# Patient Record
Sex: FEMALE | Race: WHITE | Employment: FULL TIME | URBAN - NONMETROPOLITAN AREA
[De-identification: names, ages, dates, MRNs, and addresses within clinical notes are randomized per-mention and may not be internally consistent; named-entity substitution may affect disease eponyms.]

---

## 2017-01-03 ENCOUNTER — HISTORY (OUTPATIENT)
Dept: EMERGENCY MEDICINE | Facility: OTHER | Age: 52
End: 2017-01-03

## 2017-01-23 ENCOUNTER — HISTORY (OUTPATIENT)
Dept: FAMILY MEDICINE | Facility: OTHER | Age: 52
End: 2017-01-23

## 2018-01-23 ENCOUNTER — DOCUMENTATION ONLY (OUTPATIENT)
Dept: FAMILY MEDICINE | Facility: OTHER | Age: 53
End: 2018-01-23

## 2018-01-23 PROBLEM — N39.498 OTHER URINARY INCONTINENCE: Status: ACTIVE | Noted: 2018-01-23

## 2018-01-23 PROBLEM — F41.0 PANIC DISORDER: Status: ACTIVE | Noted: 2018-01-23

## 2018-01-23 PROBLEM — N87.9 DYSPLASIA OF CERVIX: Status: ACTIVE | Noted: 2018-01-23

## 2018-01-23 RX ORDER — DIPHENOXYLATE HYDROCHLORIDE AND ATROPINE SULFATE 2.5; .025 MG/1; MG/1
1 TABLET ORAL DAILY
COMMUNITY
End: 2019-10-01

## 2018-01-23 RX ORDER — ZOLPIDEM TARTRATE 5 MG/1
TABLET ORAL
COMMUNITY
Start: 2016-05-07 | End: 2019-10-01

## 2018-01-23 RX ORDER — CITALOPRAM HYDROBROMIDE 40 MG/1
40 TABLET ORAL DAILY
COMMUNITY
Start: 2016-03-07 | End: 2018-10-15

## 2018-01-23 RX ORDER — CLONAZEPAM 0.5 MG/1
0.5 TABLET ORAL DAILY
COMMUNITY
Start: 2016-03-21 | End: 2019-10-01

## 2018-01-23 RX ORDER — ALBUTEROL SULFATE 90 UG/1
2 AEROSOL, METERED RESPIRATORY (INHALATION) 4 TIMES DAILY PRN
COMMUNITY
Start: 2017-01-03 | End: 2019-10-01

## 2018-03-01 ENCOUNTER — OFFICE VISIT (OUTPATIENT)
Dept: FAMILY MEDICINE | Facility: OTHER | Age: 53
End: 2018-03-01
Attending: NURSE PRACTITIONER
Payer: COMMERCIAL

## 2018-03-01 VITALS
TEMPERATURE: 98.6 F | HEART RATE: 87 BPM | SYSTOLIC BLOOD PRESSURE: 130 MMHG | BODY MASS INDEX: 26.84 KG/M2 | DIASTOLIC BLOOD PRESSURE: 66 MMHG | WEIGHT: 151.5 LBS | OXYGEN SATURATION: 93 % | HEIGHT: 63 IN

## 2018-03-01 DIAGNOSIS — J01.00 ACUTE NON-RECURRENT MAXILLARY SINUSITIS: Primary | ICD-10-CM

## 2018-03-01 PROCEDURE — 99213 OFFICE O/P EST LOW 20 MIN: CPT | Performed by: NURSE PRACTITIONER

## 2018-03-01 PROCEDURE — G0463 HOSPITAL OUTPT CLINIC VISIT: HCPCS

## 2018-03-01 RX ORDER — CEFDINIR 300 MG/1
300 CAPSULE ORAL 2 TIMES DAILY
Qty: 14 CAPSULE | Refills: 0 | Status: SHIPPED | OUTPATIENT
Start: 2018-03-01 | End: 2018-03-08

## 2018-03-01 ASSESSMENT — PAIN SCALES - GENERAL: PAINLEVEL: MILD PAIN (2)

## 2018-03-01 ASSESSMENT — ENCOUNTER SYMPTOMS: FEVER: 1

## 2018-03-01 NOTE — MR AVS SNAPSHOT
After Visit Summary   3/1/2018    Rowena Henry    MRN: [id number]           Patient Information     Date Of Birth          1965        Visit Information        Provider Department      3/1/2018 2:00 PM Otilia Yee APRN [facility]        Today's Diagnoses     Acute non-recurrent maxillary sinusitis    -  1      Care Instructions      Sinusitis (Antibiotic Treatment)    The sinuses are air-filled spaces within the bones of the face. They connect to the inside of the nose. Sinusitis is an inflammation of the tissue lining the sinus cavity. Sinus inflammation can occur during a cold. It can also be due to allergies to pollens and other particles in the air. Sinusitis can cause symptoms of sinus congestion and fullness. A sinus infection causes fever, headache and facial pain. There is often green or yellow drainage from the nose or into the back of the throat (post-nasal drip). You have been given antibiotics to treat this condition.  Home care:    Take the full course of antibiotics as instructed. Do not stop taking them, even if you feel better.    Drink plenty of water, hot tea, and other liquids. This may help thin mucus. It also may promote sinus drainage.    Heat may help soothe painful areas of the face. Use a towel soaked in hot water. Or,  the shower and direct the hot spray onto your face. Using a vaporizer along with a menthol rub at night may also help.     An expectorant containing guaifenesin may help thin the mucus and promote drainage from the sinuses.    Over-the-counter decongestants may be used unless a similar medicine was prescribed. Nasal sprays work the fastest. Use one that contains phenylephrine or oxymetazoline. First blow the nose gently. Then use the spray. Do not use these medicines more often than directed on the label or symptoms may get worse. You may also use tablets containing pseudoephedrine. Avoid products that  combine ingredients, because side effects may be increased. Read labels. You can also ask the pharmacist for help. (NOTE: Persons with high blood pressure should not use decongestants. They can raise blood pressure.)    Over-the-counter antihistamines may help if allergies contributed to your sinusitis.      Do not use nasal rinses or irrigation during an acute sinus infection, unless told to by your health care provider. Rinsing may spread the infection to other sinuses.    Use acetaminophen or ibuprofen to control pain, unless another pain medicine was prescribed. (If you have chronic liver or kidney disease or ever had a stomach ulcer, talk with your doctor before using these medicines. Aspirin should never be used in anyone under 18 years of age who is ill with a fever. It may cause severe liver damage.)    Don't smoke. This can worsen symptoms.  Follow-up care  Follow up with your healthcare provider or our staff if you are not improving within the next week.  When to seek medical advice  Call your healthcare provider if any of these occur:    Facial pain or headache becoming more severe    Stiff neck    Unusual drowsiness or confusion    Swelling of the forehead or eyelids    Vision problems, including blurred or double vision    Fever of 100.4 F (38 C) or higher, or as directed by your healthcare provider    Seizure    Breathing problems    Symptoms not resolving within 10 days  Date Last Reviewed: 4/13/2015 2000-2017 [employer]. [address] [place] [address], [place] [address]. All rights reserved. This information is not intended as a substitute for professional medical care. Always follow your healthcare professional's instructions.                Follow-ups after your visit        Who to contact     If you have questions or need follow up information about today's clinic visit or your schedule please contact [facility] directly at [phone].  Normal or non-critical lab  "and imaging results will be communicated to you by MyChart, letter or phone within 4 business days after the clinic has received the results. If you do not hear from us within 7 days, please contact the clinic through [employer] or phone. If you have a critical or abnormal lab result, we will notify you by phone as soon as possible.  Submit refill requests through [employer] or call your pharmacy and they will forward the refill request to us. Please allow 3 business days for your refill to be completed.          Additional Information About Your Visit        OpenSynergyharThat{img} Information     [employer] lets you send messages to your doctor, view your test results, renew your prescriptions, schedule appointments and more. To sign up, go to [email or web address] . Click on \"Log in\" on the left side of the screen, which will take you to the Welcome page. Then click on \"Sign up Now\" on the right side of the page.     You will be asked to enter the access code listed below, as well as some personal information. Please follow the directions to create your username and password.     Your access code is: [email or web address]  Expires: 2018  2:55 PM     Your access code will  in 90 days. If you need help or a new code, please call your [place] clinic or [phone].        Care EveryWhere ID     This is your Care EveryWhere ID. This could be used by other organizations to access your [place] medical records  [id number]        Your Vitals Were     Pulse Temperature Height Pulse Oximetry Breastfeeding? BMI (Body Mass Index)    87 98.6  F (37  C) (Tympanic) 5' 3\" (1.6 m) 93% No 26.84 kg/m2       Blood Pressure from Last 3 Encounters:   18 130/66   16 110/60   16 122/72    Weight from Last 3 Encounters:   18 151 lb 8 oz (68.7 kg)   16 154 lb 3.2 oz (69.9 kg)   16 154 lb (69.9 kg)              Today, you had the following     No orders found for display         Today's Medication Changes          These " changes are accurate as of 3/1/18  2:55 PM.  If you have any questions, ask your nurse or doctor.               Start taking these medicines.        Dose/Directions    cefdinir 300 MG capsule   Commonly known as:  OMNICEF   Used for:  Acute non-recurrent maxillary sinusitis   Started by:  Otilia Yee APRN CNP        Dose:  300 mg   Take 1 capsule (300 mg) by mouth 2 times daily for 7 days   Quantity:  14 capsule   Refills:  0            Where to get your medicines      These medications were sent to [id number] - [place], [place] - [address]  [address], [facility] [address]     Phone:  [phone]     cefdinir 300 MG capsule                Primary Care Provider    Non-Provider Gidarinel       No address on file        Equal Access to Services     NAYELI MCCARTHY AH: Sachin de la o Sojagdish, waaxda luqadaha, qaybta kaalmada adeabelyabravo, laila brito . So [facility] [phone].    ATENCIÓN: Si habla español, tiene a julian disposición servicios gratuitos de asistencia lingüística. Llame al [phone].    We comply with applicable federal civil rights laws and [place] laws. We do not discriminate on the basis of race, color, national origin, age, disability, sex, sexual orientation, or gender identity.            Thank you!     Thank you for choosing [facility] \[facility]\""  for your care. Our goal is always to provide you with excellent care. Hearing back from our patients is one way we can continue to improve our services. Please take a few minutes to complete the written survey that you may receive in the mail after your visit with us. Thank you!             Your Updated Medication List - Protect others around you: Learn how to safely use, store and throw away your medicines at www.disposemymeds.org.          This list is accurate as of 3/1/18  2:55 PM.  Always use your most recent med list.                   Brand Name  Dispense Instructions for use Diagnosis    albuterol 108 (90 BASE) MCG/ACT Inhaler    PROAIR HFA/PROVENTIL HFA/VENTOLIN HFA     Inhale 2 puffs into the lungs 4 times daily as needed        CALCIUM 600 + D PO      Take 1 tablet by mouth 2 times daily        CALCIUM 600+D 600-200 MG-UNIT Tabs   Generic drug:  calcium carbonate-vitamin D      Take 1 tablet by mouth 2 times daily.        cefdinir 300 MG capsule    OMNICEF    14 capsule    Take 1 capsule (300 mg) by mouth 2 times daily for 7 days    Acute non-recurrent maxillary sinusitis       * celeXA 40 MG tablet   Generic drug:  citalopram      Take 1 tablet by mouth daily.        * citalopram 40 MG tablet    celeXA     Take 40 mg by mouth daily        * klonoPIN 1 MG tablet   Generic drug:  clonazePAM      Take 1 tablet by mouth. Every bedtime        * clonazePAM 0.5 MG tablet    klonoPIN     Take 0.5 mg by mouth daily        MULTI-VITAMINS Tabs      Take 1 tablet by mouth daily        MULTIVITAMIN PO      Take 1 tablet by mouth daily with food.        zolpidem 5 MG tablet    AMBIEN          * Notice:  This list has 4 medication(s) that are the same as other medications prescribed for you. Read the directions carefully, and ask your doctor or other care provider to review them with you.

## 2018-03-01 NOTE — PATIENT INSTRUCTIONS
Sinusitis (Antibiotic Treatment)    The sinuses are air-filled spaces within the bones of the face. They connect to the inside of the nose. Sinusitis is an inflammation of the tissue lining the sinus cavity. Sinus inflammation can occur during a cold. It can also be due to allergies to pollens and other particles in the air. Sinusitis can cause symptoms of sinus congestion and fullness. A sinus infection causes fever, headache and facial pain. There is often green or yellow drainage from the nose or into the back of the throat (post-nasal drip). You have been given antibiotics to treat this condition.  Home care:    Take the full course of antibiotics as instructed. Do not stop taking them, even if you feel better.    Drink plenty of water, hot tea, and other liquids. This may help thin mucus. It also may promote sinus drainage.    Heat may help soothe painful areas of the face. Use a towel soaked in hot water. Or,  the shower and direct the hot spray onto your face. Using a vaporizer along with a menthol rub at night may also help.     An expectorant containing guaifenesin may help thin the mucus and promote drainage from the sinuses.    Over-the-counter decongestants may be used unless a similar medicine was prescribed. Nasal sprays work the fastest. Use one that contains phenylephrine or oxymetazoline. First blow the nose gently. Then use the spray. Do not use these medicines more often than directed on the label or symptoms may get worse. You may also use tablets containing pseudoephedrine. Avoid products that combine ingredients, because side effects may be increased. Read labels. You can also ask the pharmacist for help. (NOTE: Persons with high blood pressure should not use decongestants. They can raise blood pressure.)    Over-the-counter antihistamines may help if allergies contributed to your sinusitis.      Do not use nasal rinses or irrigation during an acute sinus infection, unless told to by  your health care provider. Rinsing may spread the infection to other sinuses.    Use acetaminophen or ibuprofen to control pain, unless another pain medicine was prescribed. (If you have chronic liver or kidney disease or ever had a stomach ulcer, talk with your doctor before using these medicines. Aspirin should never be used in anyone under 18 years of age who is ill with a fever. It may cause severe liver damage.)    Don't smoke. This can worsen symptoms.  Follow-up care  Follow up with your healthcare provider or our staff if you are not improving within the next week.  When to seek medical advice  Call your healthcare provider if any of these occur:    Facial pain or headache becoming more severe    Stiff neck    Unusual drowsiness or confusion    Swelling of the forehead or eyelids    Vision problems, including blurred or double vision    Fever of 100.4 F (38 C) or higher, or as directed by your healthcare provider    Seizure    Breathing problems    Symptoms not resolving within 10 days  Date Last Reviewed: 4/13/2015 2000-2017 [employer]. [address] [place] [address], [place] [id number]. All rights reserved. This information is not intended as a substitute for professional medical care. Always follow your healthcare professional's instructions.

## 2018-03-01 NOTE — NURSING NOTE
Patient present to clinic today with bilateral ear pain with fevers.   Enedina Michael CMA..............3/1/2018........2:22 PM

## 2018-03-01 NOTE — PROGRESS NOTES
HPI Comments: Nursing Notes:   Enedina Michael CMA  3/1/2018  2:22 PM  Unsigned  Patient present to clinic today with bilateral ear pain with fevers.   Enedina Michael CMA..............3/1/2018........2:22 PM    Has had three bouts of symptoms that include: fevers, pressure in ears and sinuses. Body aches when fevers present with fevers up to 102, fevers are now low grade and started about a week ago this time.  No flu shot.  Almost like symptoms never fully go away, they just get better and then come back again.         Review of Systems   Constitutional: Positive for fever.        Body aches   HENT: Positive for congestion and ear pain.          Physical Exam   Constitutional: She is oriented to person, place, and time and well-developed, well-nourished, and in no distress.   HENT:   Right Ear: External ear normal.   Nose: Nose normal.   Clear effusions bilaterally, maxillary sinus tenderness   Cardiovascular: Normal heart sounds.    Pulmonary/Chest: Breath sounds normal.   Lymphadenopathy:     She has no cervical adenopathy.   Neurological: She is alert and oriented to person, place, and time.   Skin: Skin is warm and dry.   Psychiatric: Affect normal.     Assessment: On exam, well appearing female without fever, lungs clear to auscultation, TMs with clear effusions, tonsils without erythema, maxillary sinus tenderness    Plan: Treat with Omnicef 300 mgs PO BID 7 days  Tylenol/Ibuprofen as needed  Follow up as needed

## 2018-10-15 ENCOUNTER — OFFICE VISIT (OUTPATIENT)
Dept: FAMILY MEDICINE | Facility: OTHER | Age: 53
End: 2018-10-15
Payer: COMMERCIAL

## 2018-10-15 VITALS
HEIGHT: 63 IN | OXYGEN SATURATION: 95 % | TEMPERATURE: 99 F | WEIGHT: 160.1 LBS | HEART RATE: 88 BPM | BODY MASS INDEX: 28.37 KG/M2 | RESPIRATION RATE: 18 BRPM

## 2018-10-15 DIAGNOSIS — R79.89 LOW VITAMIN D LEVEL: ICD-10-CM
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
## 2018-10-15 DIAGNOSIS — R53.83 OTHER FATIGUE: ICD-10-CM

## 2018-10-15 LAB
ALBUMIN SERPL-MCNC: 4.4 G/DL (ref 3.5–5.7)
ALP SERPL-CCNC: 97 U/L (ref 34–104)
ALT SERPL W P-5'-P-CCNC: 44 U/L (ref 7–52)
ANION GAP SERPL CALCULATED.3IONS-SCNC: 7 MMOL/L (ref 3–14)
AST SERPL W P-5'-P-CCNC: 25 U/L (ref 13–39)
BILIRUB SERPL-MCNC: 0.3 MG/DL (ref 0.3–1)
BUN SERPL-MCNC: 16 MG/DL (ref 7–25)
CALCIUM SERPL-MCNC: 9.9 MG/DL (ref 8.6–10.3)
CHLORIDE SERPL-SCNC: 99 MMOL/L (ref 98–107)
CO2 SERPL-SCNC: 28 MMOL/L (ref 21–31)
CREAT SERPL-MCNC: 0.58 MG/DL (ref 0.6–1.2)
DEPRECATED CALCIDIOL+CALCIFEROL SERPL-MC: <10 NG/ML
ERYTHROCYTE [DISTWIDTH] IN BLOOD BY AUTOMATED COUNT: 11.9 % (ref 10–15)
GFR SERPL CREATININE-BSD FRML MDRD: >90 ML/MIN/1.7M2
GLUCOSE SERPL-MCNC: 102 MG/DL (ref 70–105)
HCT VFR BLD AUTO: 41.9 % (ref 35–47)
HGB BLD-MCNC: 14.4 G/DL (ref 11.7–15.7)
MCH RBC QN AUTO: 30.9 PG (ref 26.5–33)
MCHC RBC AUTO-ENTMCNC: 34.4 G/DL (ref 31.5–36.5)
MCV RBC AUTO: 90 FL (ref 78–100)
PHOSPHATE SERPL-MCNC: 4.2 MG/DL (ref 2.5–5)
PLATELET # BLD AUTO: 288 10E9/L (ref 150–450)
POTASSIUM SERPL-SCNC: 3.9 MMOL/L (ref 3.5–5.1)
PROT SERPL-MCNC: 7.1 G/DL (ref 6.4–8.9)
PTH-INTACT SERPL-MCNC: 51 PG/ML (ref 12–88)
RBC # BLD AUTO: 4.66 10E12/L (ref 3.8–5.2)
SODIUM SERPL-SCNC: 134 MMOL/L (ref 134–144)
TSH SERPL DL<=0.05 MIU/L-ACNC: 2.98 IU/ML (ref 0.34–5.6)
WBC # BLD AUTO: 6.5 10E9/L (ref 4–11)

## 2018-10-15 PROCEDURE — 84443 ASSAY THYROID STIM HORMONE: CPT | Performed by: NURSE PRACTITIONER

## 2018-10-15 PROCEDURE — 87798 DETECT AGENT NOS DNA AMP: CPT | Performed by: NURSE PRACTITIONER

## 2018-10-15 PROCEDURE — 85027 COMPLETE CBC AUTOMATED: CPT | Performed by: NURSE PRACTITIONER

## 2018-10-15 PROCEDURE — 82306 VITAMIN D 25 HYDROXY: CPT | Performed by: NURSE PRACTITIONER

## 2018-10-15 PROCEDURE — 83970 ASSAY OF PARATHORMONE: CPT | Performed by: NURSE PRACTITIONER

## 2018-10-15 PROCEDURE — 36415 COLL VENOUS BLD VENIPUNCTURE: CPT | Performed by: NURSE PRACTITIONER

## 2018-10-15 PROCEDURE — G0463 HOSPITAL OUTPT CLINIC VISIT: HCPCS

## 2018-10-15 PROCEDURE — 84100 ASSAY OF PHOSPHORUS: CPT | Performed by: NURSE PRACTITIONER

## 2018-10-15 PROCEDURE — 86618 LYME DISEASE ANTIBODY: CPT | Performed by: NURSE PRACTITIONER

## 2018-10-15 PROCEDURE — 80053 COMPREHEN METABOLIC PANEL: CPT | Performed by: NURSE PRACTITIONER

## 2018-10-15 PROCEDURE — 99213 OFFICE O/P EST LOW 20 MIN: CPT | Performed by: NURSE PRACTITIONER

## 2018-10-15 RX ORDER — ERGOCALCIFEROL 1.25 MG/1
50000 CAPSULE, LIQUID FILLED ORAL
Qty: 8 CAPSULE | Refills: 0 | Status: SHIPPED | OUTPATIENT
Start: 2018-10-15 | End: 2018-12-04

## 2018-10-15 ASSESSMENT — PAIN SCALES - GENERAL: PAINLEVEL: NO PAIN (0)

## 2018-10-15 NOTE — PROGRESS NOTES
HPI:    Rowena Henry is a 53 year old female who presents to clinic today for tick bite. She was bit by a tick about 4 months ago. Feel this is not healing, was bit in left groin. Using peroxide to area every other day.  Is also having fatigue, generally not feeling well. She has had hot flashes. No fevers. No body aches. Decrease in Klonopin, no other medications changes. Fees depression/anxiety is well managed.     Past Medical History:   Diagnosis Date     Breast lump     12/04     Dysplasia of cervix uteri     No Comments Provided       Past Surgical History:   Procedure Laterality Date     COLONOSCOPY      2/18/13,Colonoscopy F/U 2016     OTHER SURGICAL HISTORY      238717,OTHER     OTHER SURGICAL HISTORY      02969.0,AR CONIZATION CERVIX KNIFE/LASER     OTHER SURGICAL HISTORY      1989,04675.0,AR LIGATE FALLOPIAN TUBE     OTHER SURGICAL HISTORY      1/2011,206355,OTHER,[place]       Family History   Problem Relation Age of Onset     HEART DISEASE Mother      Heart Disease     HEART DISEASE Maternal Grandfather      Heart Disease     HEART DISEASE Father      Heart Disease     Other - See Comments Maternal Grandmother 80     leukemia     HEART DISEASE Maternal Uncle      Heart Disease     Other - See Comments Paternal Uncle 60     melanoma     Breast Cancer No family hx of      Cancer-breast     Colon Cancer No family hx of      Cancer-colon     Ovarian Cancer No family hx of      Cancer-ovarian     Prostate Cancer No family hx of      Cancer-prostate     Diabetes No family hx of      Diabetes     Hypertension No family hx of      Hypertension     Thyroid Disease No family hx of      Thyroid Disease       Social History     Social History     Marital status: Unknown     Spouse name: N/A     Number of children: N/A     Years of education: N/A     Occupational History     Not on file.     Social History Main Topics     Smoking status: Current Every Day Smoker     Packs/day: 1.00     Years: 32.00     Types:  Cigarettes     Smokeless tobacco: Never Used     Alcohol use 12.6 oz/week      Comment: Alcoholic Drinks/day: nightly wine  to help her sleep     Drug use: No      Comment: Drug use: No     Sexual activity: Yes     Partners: Male     Birth control/ protection: Surgical     Other Topics Concern     Not on file     Social History Narrative    Single, lives with her boyfriend.    Has 2 children, one son 1985 and he has a child and lives in , 1 daughter 1988, lives in [place].       Current Outpatient Prescriptions   Medication Sig Dispense Refill     albuterol (PROAIR HFA/PROVENTIL HFA/VENTOLIN HFA) 108 (90 BASE) MCG/ACT Inhaler Inhale 2 puffs into the lungs 4 times daily as needed       Calcium Carb-Cholecalciferol (CALCIUM 600 + D PO) Take 1 tablet by mouth 2 times daily       calcium carbonate-vitamin D (CALCIUM 600+D) 600-200 MG-UNIT TABS Take 1 tablet by mouth 2 times daily.       citalopram (CELEXA) 40 MG tablet Take 1 tablet by mouth daily.       clonazePAM (KLONOPIN) 0.5 MG tablet Take 0.5 mg by mouth daily       clonazePAM (KLONOPIN) 1 MG tablet Take 1 tablet by mouth. Every bedtime       Multiple Vitamin (MULTI-VITAMINS) TABS Take 1 tablet by mouth daily       Multiple Vitamins-Minerals (MULTIVITAMIN OR) Take 1 tablet by mouth daily with food.       zolpidem (AMBIEN) 5 MG tablet        [DISCONTINUED] citalopram (CELEXA) 40 MG tablet Take 40 mg by mouth daily         No Known Allergies    ROS:  Pertinent positives and negatives are noted in HPI.    EXAM:  General appearance: well appearing female, in no acute distress  Respiratory: clear to auscultation bilaterally  Cardiac: RRR with no murmurs  Dermatological: left groin with pea sized purplish discoloration noted  Psychological: normal affect, alert and pleasant    ASSESSMENT AND PLAN:    1. Tick bite, initial encounter    2. Other fatigue      Tick bite location is healing well. Discussed no longer using hydrogen peroxide. Labs drawn for tick testing and  fatigue work up. Plan to f/u as needed.       Carla Zuleta..................10/15/2018 1:30 PM

## 2018-10-15 NOTE — NURSING NOTE
Patient presents to the clinic for tick bite she received about 4 months ago. Unsure of what kind of tick. Bite located to upper thigh and states it's not healing. Has felt tired and muscle aches since.   Trinh Schmid LPN............. October 15, 2018 1:27 PM

## 2018-10-15 NOTE — MR AVS SNAPSHOT
"              After Visit Summary   10/15/2018    Rowena Henry    MRN: [id number]           Patient Information     Date Of Birth          1965        Visit Information        Provider Department      10/15/2018 1:15 PM Carla Zuleta APRN CNP [facility]        Today's Diagnoses     Tick bite, initial encounter    -  1    Other fatigue           Follow-ups after your visit        Who to contact     If you have questions or need follow up information about today's clinic visit or your schedule please contact [facility] directly at [phone].  Normal or non-critical lab and imaging results will be communicated to you by SMS [employer], letter or phone within 4 business days after the clinic has received the results. If you do not hear from us within 7 days, please contact the clinic through SMS [employer] or phone. If you have a critical or abnormal lab result, we will notify you by phone as soon as possible.  Submit refill requests through [employer] or call your pharmacy and they will forward the refill request to us. Please allow 3 business days for your refill to be completed.          Additional Information About Your Visit        MyChart Information     [employer] lets you send messages to your doctor, view your test results, renew your prescriptions, schedule appointments and more. To sign up, go to [email or web address] . Click on \"Log in\" on the left side of the screen, which will take you to the Welcome page. Then click on \"Sign up Now\" on the right side of the page.     You will be asked to enter the access code listed below, as well as some personal information. Please follow the directions to create your username and password.     Your access code is: [email or web address]  Expires: 2019  2:14 PM     Your access code will  in 90 days. If you need help or a new code, please call your [place] clinic or [phone].        Care EveryWhere ID     This is your Care " "EveryWhere ID. This could be used by other organizations to access your [place] medical records  [id number]        Your Vitals Were     Pulse Temperature Respirations Height Pulse Oximetry Breastfeeding?    88 99  F (37.2  C) (Tympanic) 18 5' 3\" (1.6 m) 95% No    BMI (Body Mass Index)                   28.36 kg/m2            Blood Pressure from Last 3 Encounters:   03/01/18 130/66   03/21/16 110/60   02/08/16 122/72    Weight from Last 3 Encounters:   10/15/18 160 lb 1.6 oz (72.6 kg)   03/01/18 151 lb 8 oz (68.7 kg)   03/21/16 154 lb 3.2 oz (69.9 kg)              We Performed the Following     CBC W PLT No Diff     Comprehensive Metabolic Panel     Ehrlichia Anaplasma Sp by PCR     Lyme Disease Ab with reflex to WB Serum     TSH     Vitamin D Total        Primary Care Provider    Non-Provider [place]       No address on file        Equal Access to Services     NAYELI MCCARTHY : Hadii debi Villanueva, wasandida alexsandra, qaybta kaalmada moises, laila brito . So [facility] [phone].    ATENCIÓN: Si habla español, tiene a julian disposición servicios gratuitos de asistencia lingüística. Llame al [phone].    We comply with applicable federal civil rights laws and [place] laws. We do not discriminate on the basis of race, color, national origin, age, disability, sex, sexual orientation, or gender identity.            Thank you!     Thank you for choosing [facility]\""  for your care. Our goal is always to provide you with excellent care. Hearing back from our patients is one way we can continue to improve our services. Please take a few minutes to complete the written survey that you may receive in the mail after your visit with us. Thank you!             Your Updated Medication List - Protect others around you: Learn how to safely use, store and throw away your medicines at www.disposemymeds.org.          This list is accurate as of 10/15/18  2:14 PM.  Always use " your most recent med list.                   Brand Name Dispense Instructions for use Diagnosis    albuterol 108 (90 Base) MCG/ACT inhaler    PROAIR HFA/PROVENTIL HFA/VENTOLIN HFA     Inhale 2 puffs into the lungs 4 times daily as needed        CALCIUM 600 + D PO      Take 1 tablet by mouth 2 times daily        CALCIUM 600+D 600-200 MG-UNIT Tabs   Generic drug:  calcium carbonate-vitamin D      Take 1 tablet by mouth 2 times daily.        celeXA 40 MG tablet   Generic drug:  citalopram      Take 1 tablet by mouth daily.        * klonoPIN 1 MG tablet   Generic drug:  clonazePAM      Take 1 tablet by mouth. Every bedtime        * clonazePAM 0.5 MG tablet    klonoPIN     Take 0.5 mg by mouth daily        MULTI-VITAMINS Tabs      Take 1 tablet by mouth daily        MULTIVITAMIN PO      Take 1 tablet by mouth daily with food.        zolpidem 5 MG tablet    AMBIEN          * Notice:  This list has 2 medication(s) that are the same as other medications prescribed for you. Read the directions carefully, and ask your doctor or other care provider to review them with you.

## 2018-10-17 LAB — B BURGDOR IGG+IGM SER QL: 0.08 (ref 0–0.89)

## 2018-10-19 LAB
A PHAGOCYTOPH DNA BLD QL NAA+PROBE: NOT DETECTED
E CHAFFEENSIS DNA BLD QL NAA+PROBE: NOT DETECTED
E EWINGII DNA SPEC QL NAA+PROBE: NOT DETECTED
EHRLICHIA DNA SPEC QL NAA+PROBE: NOT DETECTED

## 2018-11-12 ENCOUNTER — OFFICE VISIT (OUTPATIENT)
Dept: FAMILY MEDICINE | Facility: OTHER | Age: 53
End: 2018-11-12
Payer: COMMERCIAL

## 2018-11-12 VITALS
HEIGHT: 63 IN | WEIGHT: 161.2 LBS | TEMPERATURE: 98.4 F | HEART RATE: 83 BPM | OXYGEN SATURATION: 96 % | SYSTOLIC BLOOD PRESSURE: 130 MMHG | BODY MASS INDEX: 28.56 KG/M2 | DIASTOLIC BLOOD PRESSURE: 60 MMHG

## 2018-11-12 DIAGNOSIS — S00.31XA ABRASION OF NOSE, INITIAL ENCOUNTER: Primary | ICD-10-CM

## 2018-11-12 PROCEDURE — 99213 OFFICE O/P EST LOW 20 MIN: CPT | Performed by: NURSE PRACTITIONER

## 2018-11-12 PROCEDURE — G0463 HOSPITAL OUTPT CLINIC VISIT: HCPCS

## 2018-11-12 ASSESSMENT — PAIN SCALES - GENERAL: PAINLEVEL: NO PAIN (1)

## 2018-11-12 NOTE — NURSING NOTE
Patient presents with nose laceration. Pt slipped on ice yesterday and hit her nose on the door.   Carri Cavanaugh LPN....................  11/12/2018   2:25 PM

## 2018-11-12 NOTE — MR AVS SNAPSHOT
"              After Visit Summary   11/12/2018    Rowena Henry    MRN: [id number]           Patient Information     Date Of Birth          1965        Visit Information        Provider Department      11/12/2018 1:30 PM [facility] NURSE [facility] and [facility]        Care Instructions    Wash the wound in warm water with antibacterial soap for 5-10 minutes at a time two to three times per day until healing is established. Light coat of antibiotic ointments could be used. You can keep it covered with a bandage in order to decrease infection concerns.     Watch for any signs of increasing infection (redness, pus, increased pain (may be along the tendon and back of hand if the hand involved), increased swelling or fever). Call if any of these signs occur. Monitor for fevers or chills.    Call or return to clinic as needed if your symptoms worsen or fail to improve as anticipated.              Follow-ups after your visit        Who to contact     If you have questions or need follow up information about today's clinic visit or your schedule please contact [facility] directly at [phone].  Normal or non-critical lab and imaging results will be communicated to you by [employer], letter or phone within 4 business days after the clinic has received the results. If you do not hear from us within 7 days, please contact the clinic through [employer] or phone. If you have a critical or abnormal lab result, we will notify you by phone as soon as possible.  Submit refill requests through [facility] or call your pharmacy and they will forward the refill request to us. Please allow 3 business days for your refill to be completed.          Additional Information About Your Visit        [employer] Information     [facility] lets you send messages to your doctor, view your test results, renew your prescriptions, schedule appointments and more. To sign up, go to [email or web address] . Click on \"Log in\" on the " "left side of the screen, which will take you to the Welcome page. Then click on \"Sign up Now\" on the right side of the page.     You will be asked to enter the access code listed below, as well as some personal information. Please follow the directions to create your username and password.     Your access code is: [email or web address]  Expires: 2019  1:14 PM     Your access code will  in 90 days. If you need help or a new code, please call your [facility] or [phone].        Care EveryWhere ID     This is your Care EveryWhere ID. This could be used by other organizations to access your [facility] medical records  [id number]        Your Vitals Were     Pulse Temperature Height Pulse Oximetry Breastfeeding? BMI (Body Mass Index)    83 98.4  F (36.9  C) (Tympanic) 5' 3\" (1.6 m) 96% No 28.56 kg/m2       Blood Pressure from Last 3 Encounters:   18 130/60   18 130/66   16 110/60    Weight from Last 3 Encounters:   18 161 lb 3.2 oz (73.1 kg)   10/15/18 160 lb 1.6 oz (72.6 kg)   18 151 lb 8 oz (68.7 kg)              Today, you had the following     No orders found for display       Primary Care Provider    Non-Provider Gich       No address on file        Equal Access to Services     [facility]: Hadii debi dempsey hadasho Sojagdish, waaxda luqadaha, qaybta kaalmada adetiffanyda, laila brito . So [facility] [phone].    ATENCIÓN: Si habla español, tiene a julian disposición servicios gratuitos de asistencia lingüística. Llame al [phone].    We comply with applicable federal civil rights laws and [place] laws. We do not discriminate on the basis of race, color, national origin, age, disability, sex, sexual orientation, or gender identity.            Thank you!     Thank you for choosing [facility] AND [facility]  for your care. Our goal is always to provide you with excellent care. Hearing back from our patients is one way we can continue to improve our " services. Please take a few minutes to complete the written survey that you may receive in the mail after your visit with us. Thank you!             Your Updated Medication List - Protect others around you: Learn how to safely use, store and throw away your medicines at www.disposemymeds.org.          This list is accurate as of 11/12/18  2:37 PM.  Always use your most recent med list.                   Brand Name Dispense Instructions for use Diagnosis    albuterol 108 (90 Base) MCG/ACT inhaler    PROAIR HFA/PROVENTIL HFA/VENTOLIN HFA     Inhale 2 puffs into the lungs 4 times daily as needed        CALCIUM 600 + D PO      Take 1 tablet by mouth 2 times daily        CALCIUM 600+D 600-200 MG-UNIT Tabs   Generic drug:  calcium carbonate-vitamin D      Take 1 tablet by mouth 2 times daily.        celeXA 40 MG tablet   Generic drug:  citalopram      Take 1 tablet by mouth daily.        * klonoPIN 1 MG tablet   Generic drug:  clonazePAM      Take 1 tablet by mouth. Every bedtime        * clonazePAM 0.5 MG tablet    klonoPIN     Take 0.5 mg by mouth daily        MULTI-VITAMINS Tabs      Take 1 tablet by mouth daily        MULTIVITAMIN PO      Take 1 tablet by mouth daily with food.        vitamin D2 74527 UNIT capsule    ERGOCALCIFEROL    8 capsule    Take 1 capsule (50,000 Units) by mouth every 7 days for 8 doses    Low vitamin D level       zolpidem 5 MG tablet    AMBIEN          * Notice:  This list has 2 medication(s) that are the same as other medications prescribed for you. Read the directions carefully, and ask your doctor or other care provider to review them with you.

## 2018-11-12 NOTE — PATIENT INSTRUCTIONS
Wash the wound in warm water with antibacterial soap for 5-10 minutes at a time two to three times per day until healing is established. Light coat of antibiotic ointments could be used. You can keep it covered with a bandage in order to decrease infection concerns.     Watch for any signs of increasing infection (redness, pus, increased pain (may be along the tendon and back of hand if the hand involved), increased swelling or fever). Call if any of these signs occur. Monitor for fevers or chills.    Call or return to clinic as needed if your symptoms worsen or fail to improve as anticipated.

## 2018-11-12 NOTE — PROGRESS NOTES
"Nursing Notes:   Afshan CarriCHANDRAKANT  11/12/2018  2:36 PM  Signed  Patient presents with nose laceration. Pt slipped on ice yesterday and hit her nose on the door.   Carri Afshan CHANDRAKANT....................  11/12/2018   2:25 PM      SUBJECTIVE:   Rowena Henry is a 53 year old female who presents to clinic today for the following health issues:    Here with a nose abrasion. Injury occurred greater then 24 hours ago. She hit her nose on the door and cut it. DOI 11/11/18It is not bleeding any longer. She did not have LOC, no vision changes, no headaches.       Problem list and histories reviewed & adjusted, as indicated.  Additional history: as documented    Current Outpatient Prescriptions   Medication Sig Dispense Refill     albuterol (PROAIR HFA/PROVENTIL HFA/VENTOLIN HFA) 108 (90 BASE) MCG/ACT Inhaler Inhale 2 puffs into the lungs 4 times daily as needed       Calcium Carb-Cholecalciferol (CALCIUM 600 + D PO) Take 1 tablet by mouth 2 times daily       calcium carbonate-vitamin D (CALCIUM 600+D) 600-200 MG-UNIT TABS Take 1 tablet by mouth 2 times daily.       citalopram (CELEXA) 40 MG tablet Take 1 tablet by mouth daily.       clonazePAM (KLONOPIN) 0.5 MG tablet Take 0.5 mg by mouth daily       clonazePAM (KLONOPIN) 1 MG tablet Take 1 tablet by mouth. Every bedtime       Multiple Vitamin (MULTI-VITAMINS) TABS Take 1 tablet by mouth daily       Multiple Vitamins-Minerals (MULTIVITAMIN OR) Take 1 tablet by mouth daily with food.       vitamin D (ERGOCALCIFEROL) 40071 UNIT capsule Take 1 capsule (50,000 Units) by mouth every 7 days for 8 doses 8 capsule 0     zolpidem (AMBIEN) 5 MG tablet        No Known Allergies      ROS:  Notable findings in the HPI.       OBJECTIVE:     /60 (BP Location: Left arm, Patient Position: Sitting, Cuff Size: Adult Regular)  Pulse 83  Temp 98.4  F (36.9  C) (Tympanic)  Ht 5' 3\" (1.6 m)  Wt 161 lb 3.2 oz (73.1 kg)  SpO2 96%  Breastfeeding? No  BMI 28.56 kg/m2  Body mass index " is 28.56 kg/(m^2).  GENERAL: healthy, alert and no distress  EYES: Eyes grossly normal to inspection  HENT: normal cephalic/atraumatic and oral mucous membranes moist  RESP: without increased work of breathing.   SKIN: Mild abrasions noted on the forehead between eyes, In the center of bridge of nose there is an open area appears like a laceration with lost tissue. Appears at least stated age of laceration, could be older.     Diagnostic Test Results:  none     ASSESSMENT/PLAN:     1. Abrasion of nose, initial encounter  Explained good wound cares, home cares. And f/u if needed.         PLAN:    Laceration:    Keep wound clean and dry for the next 24-48 hours  Ok to shower and get wound wet after 48 hours  May return to work/school as long as wound is kept clean and dry  Discussed the probability of scarring    Patient will return immediately if they experience redness around the laceration, drainage, worsening pain, or fever.        Followup:    If not improving or if condition worsens, follow up with your Primary Care Provider    I explained my diagnostic considerations and recommendations to the patient, who voiced understanding and agreement with the treatment plan. All questions were answered. We discussed potential side effects of any prescribed or recommended therapies, as well as expectations for response to treatments. She was advised to contact our office if there is no improvement or worsening of conditions or symptoms.  If s/s worsen or persist, patient will either come back or follow up with PCP.    Disclaimer:  This note consists of words and symbols derived from keyboarding, dictation, or using voice recognition software. As a result, there may be errors in the script that have gone undetected. Please consider this when interpreting information found in this note.      Leigh Bradley NP, 11/12/2018 2:36 PM

## 2018-12-12 DIAGNOSIS — R79.89 LOW VITAMIN D LEVEL: Primary | ICD-10-CM

## 2018-12-14 RX ORDER — ERGOCALCIFEROL 1.25 MG/1
CAPSULE, LIQUID FILLED ORAL
Qty: 8 CAPSULE | OUTPATIENT
Start: 2018-12-14

## 2018-12-14 NOTE — TELEPHONE ENCOUNTER
[facility] sent Rx request for the following:     VITAMIN D2 50,000IU (ERGO) CAP RX  Sig: TAKE 1 CAPSULE BY MOUTH EVERY 7 DAYS FOR 8 DOSES    Last Written Prescription:  vitamin D (ERGOCALCIFEROL) 27556 UNIT capsule 8 capsule 0 10/15/2018 12/4/2018 --   Sig - Route: Take 1 capsule (50,000 Units) by mouth every 7 days for 8 doses     Last Office Visit: 10/15/18  Future Office visit: None.    Per result note from 10/15/18, provider's note states:  Please call and let her know that her Vitamin D is very low. I have sent in Vitamin D2 50,000 units twice weekly for 8 weeks then should have recheck of labs. She can schedule to see me for this or we can do a lab only.    Drug not on the [facility], [facility] or [facility] refill protocol or controlled substance    Called and spoke to Patient after verifying last name and date of birth. Pt states she didn't request a refill of this medication, so can be refused, but will plan to call in and make lab only appointment. Stephanie Tang RN .............. 12/14/2018  2:46 PM

## 2018-12-14 NOTE — TELEPHONE ENCOUNTER
Vitamin D Total lab pending provider thanh. Stephanie Tang RN .............. 12/14/2018  2:47 PM

## 2019-10-01 ENCOUNTER — OFFICE VISIT (OUTPATIENT)
Dept: INTERNAL MEDICINE | Facility: OTHER | Age: 54
End: 2019-10-01
Attending: INTERNAL MEDICINE

## 2019-10-01 VITALS
RESPIRATION RATE: 18 BRPM | WEIGHT: 133.6 LBS | HEART RATE: 80 BPM | SYSTOLIC BLOOD PRESSURE: 118 MMHG | DIASTOLIC BLOOD PRESSURE: 78 MMHG | TEMPERATURE: 98.5 F | BODY MASS INDEX: 23.67 KG/M2

## 2019-10-01 DIAGNOSIS — M79.2 NEUROPATHIC PAIN OF FLANK, RIGHT: Primary | ICD-10-CM

## 2019-10-01 PROCEDURE — 99213 OFFICE O/P EST LOW 20 MIN: CPT | Performed by: INTERNAL MEDICINE

## 2019-10-01 RX ORDER — ACYCLOVIR 800 MG/1
800 TABLET ORAL
Qty: 35 TABLET | Refills: 0 | Status: SHIPPED | OUTPATIENT
Start: 2019-10-01 | End: 2021-11-04

## 2019-10-01 RX ORDER — CLONAZEPAM 1 MG/1
1 TABLET ORAL 2 TIMES DAILY PRN
COMMUNITY
Start: 2019-10-01

## 2019-10-01 ASSESSMENT — ENCOUNTER SYMPTOMS
ENDOCRINE NEGATIVE: 1
EYES NEGATIVE: 1
CONSTITUTIONAL NEGATIVE: 1
ALLERGIC/IMMUNOLOGIC NEGATIVE: 1

## 2019-10-01 NOTE — NURSING NOTE
"The patient is here today to be seen for mid back pain and sensitive skin to the touch.  Janette Faulkner LPN on 10/1/2019 at 1:23 PM  Chief Complaint   Patient presents with     Back Pain       Initial /78 (BP Location: Right arm, Patient Position: Sitting, Cuff Size: Adult Regular)   Pulse 80   Temp 98.5  F (36.9  C) (Tympanic)   Resp 18   Wt 60.6 kg (133 lb 9.6 oz)   Breastfeeding? No   BMI 23.67 kg/m   Estimated body mass index is 23.67 kg/m  as calculated from the following:    Height as of 11/12/18: 1.6 m (5' 3\").    Weight as of this encounter: 60.6 kg (133 lb 9.6 oz).  Medication Reconciliation: complete    Janette Faulkner LPN    "

## 2019-10-01 NOTE — PROGRESS NOTES
Chief Complaint: Back pain.    HPI: She comes in today with a complaint of back pain.  It started on Friday.  There was no inciting event including injury or overuse.  The pain is located in the right flank area and now is extending anteriorly around her side.  There has been no rash.  No fever.  She feels somewhat bloated.  The skin feels very sensitive but at the same time it feels like she has some bloating in her abdomen and sometimes the pain feels deep.  She is here to have this evaluated further.    Past Medical History:   Diagnosis Date     Breast lump     12/04     Dysplasia of cervix uteri     No Comments Provided       Past Surgical History:   Procedure Laterality Date     COLONOSCOPY  02/18/2013 2/18/13,Colonoscopy F/U 2016     OTHER SURGICAL HISTORY      359145,OTHER     OTHER SURGICAL HISTORY      20331.0,[place] CON[place] CERVIX KNIFE/LASER     OTHER SURGICAL HISTORY      1989,93541.0,[place] LIGATE FALLOPIAN TUBE     OTHER SURGICAL HISTORY      1/2011,665213,OTHER,[place]       No Known Allergies    Current Outpatient Medications   Medication Sig Dispense Refill     acyclovir (ZOVIRAX) 800 MG tablet Take 1 tablet (800 mg) by mouth 5 times daily for 7 days 35 tablet 0     citalopram (CELEXA) 40 MG tablet Take 1 tablet by mouth daily.       clonazePAM (KLONOPIN) 1 MG tablet Take 1 tablet (1 mg) by mouth 2 times daily as needed for anxiety Every bedtime         Review of Systems   Constitutional: Negative.    Eyes: Negative.    Endocrine: Negative.    Allergic/Immunologic: Negative.        Physical Exam  Vitals signs and nursing note reviewed.   Constitutional:       General: She is not in acute distress.     Appearance: Normal appearance. She is not ill-appearing or diaphoretic.   Pulmonary:      Effort: Pulmonary effort is normal. No respiratory distress.      Breath sounds: Normal breath sounds. No stridor. No wheezing, rhonchi or rales.   Chest:      Chest wall: No tenderness.   Abdominal:       General: Abdomen is flat. There is no distension.      Palpations: Abdomen is soft. There is no mass.      Tenderness: There is no tenderness. There is no right CVA tenderness or left CVA tenderness.   Skin:     General: Skin is warm and dry.      Comments: No rash seen   Neurological:      Mental Status: She is alert.         Assessment:        ICD-10-CM    1. Neuropathic pain of flank, right M79.2 acyclovir (ZOVIRAX) 800 MG tablet       Plan: Reviewed with the patient.  At this time due to a lack of insurance she would rather not do any unnecessary testing.  I told her that based on her symptoms everything fit best with zoster except for the fact that her rash was not yet present.  It is quite possible that she will develop a rash within the next 1 to 3 days.  We will treat empirically for zoster with acyclovir.  She will watch for any developing rash.  If she does not have improvement in her symptoms or if she has additional symptoms that develop, she will obviously need further evaluation and testing.

## 2020-04-06 ENCOUNTER — OFFICE VISIT (OUTPATIENT)
Dept: FAMILY MEDICINE | Facility: OTHER | Age: 55
End: 2020-04-06
Attending: NURSE PRACTITIONER

## 2020-04-06 ENCOUNTER — HOSPITAL ENCOUNTER (OUTPATIENT)
Dept: GENERAL RADIOLOGY | Facility: OTHER | Age: 55
End: 2020-04-06
Attending: NURSE PRACTITIONER

## 2020-04-06 VITALS
TEMPERATURE: 99 F | DIASTOLIC BLOOD PRESSURE: 68 MMHG | HEART RATE: 96 BPM | BODY MASS INDEX: 28.46 KG/M2 | HEIGHT: 63 IN | WEIGHT: 160.6 LBS | RESPIRATION RATE: 16 BRPM | SYSTOLIC BLOOD PRESSURE: 138 MMHG | OXYGEN SATURATION: 91 %

## 2020-04-06 DIAGNOSIS — S96.912A STRAIN OF LEFT FOOT, INITIAL ENCOUNTER: Primary | ICD-10-CM

## 2020-04-06 DIAGNOSIS — S99.922A FOOT INJURY, LEFT, INITIAL ENCOUNTER: ICD-10-CM

## 2020-04-06 PROCEDURE — 99213 OFFICE O/P EST LOW 20 MIN: CPT | Performed by: NURSE PRACTITIONER

## 2020-04-06 PROCEDURE — 73630 X-RAY EXAM OF FOOT: CPT | Mod: LT

## 2020-04-06 ASSESSMENT — PAIN SCALES - GENERAL: PAINLEVEL: MODERATE PAIN (5)

## 2020-04-06 ASSESSMENT — MIFFLIN-ST. JEOR: SCORE: 1292.61

## 2020-04-06 NOTE — PROGRESS NOTES
HPI:    Rowena Henry is a 54 year old female  who presents to clinic today for foot injury/pain.    States she injured her left foot this morning, she tripped while walking up concrete steps.  She thinks she bent the foot upwards but is not entirely sure as it happed so quick. Pain and swelling along the lateral mid foot.  Pain worse with weight bearing and walking.  No numbness or tingling.  States she previously stubbed her left fourth toe a while ago.    Iced and elevated today.  No tylenol or ibuprofen        Past Medical History:   Diagnosis Date     Breast lump     12/04     Dysplasia of cervix uteri     No Comments Provided     Past Surgical History:   Procedure Laterality Date     COLONOSCOPY  02/18/2013 2/18/13,Colonoscopy F/U 2016     OTHER SURGICAL HISTORY      [id number],OTHER     OTHER SURGICAL HISTORY      37969.0,[place] CERVIX [place]NIFE/LASER     OTHER SURGICAL HISTORY      1989,88053.0,[place] LIG[place] FALLOPIAN TUBE     OTHER SURGICAL HISTORY      1/2011,793863,OTHER,[place]     Social History     Tobacco Use     Smoking status: Current Every Day Smoker     Packs/day: 1.00     Years: 32.00     Pack years: 32.00     Types: Cigarettes     Smokeless tobacco: Never Used   Substance Use Topics     Alcohol use: Yes     Alcohol/week: 21.0 standard drinks     Comment: Alcoholic Drinks/day: nightly wine  to help her sleep     Current Outpatient Medications   Medication Sig Dispense Refill     acyclovir (ZOVIRAX) 800 MG tablet Take 1 tablet (800 mg) by mouth 5 times daily for 7 days 35 tablet 0     citalopram (CELEXA) 40 MG tablet Take 1 tablet by mouth daily.       clonazePAM (KLONOPIN) 1 MG tablet Take 1 tablet (1 mg) by mouth 2 times daily as needed for anxiety Every bedtime       Multiple Vitamins-Minerals (MULTIVITAMIN ADULT PO) Take 1 tablet by mouth       No Known Allergies      Past medical history, past surgical history, current medications and allergies reviewed and accurate to the best of my  "knowledge.        ROS:  Refer to HPI    /68 (BP Location: Right arm, Cuff Size: Adult Regular)   Pulse 96   Temp 99  F (37.2  C)   Resp 16   Ht 1.6 m (5' 3\")   Wt 72.8 kg (160 lb 9.6 oz)   SpO2 91%   Breastfeeding No   BMI 28.45 kg/m      EXAM:  General Appearance: Well appearing adult female, appropriate appearance for age. No acute distress  Orophayrnx: voice clear.    Respiratory: normal chest wall and respirations.  Normal effort.    Cardiovascular:  CMS intact to left lower extremity, brisk capillary refill, strong pedal and posterior tibial pulses  Musculoskeletal:  Equal movement of bilateral upper extremities.  Left dorsal lateral foot with mild mild swelling and tenderness to palpation.  Left ankle without swelling or tenderness.  Left ankle with normal ROM.  Left toes without swelling, able to wiggle.  Equal movement of bilateral lower extremities.  Normal gait.   Dermatological: skin intact to left lower extremity, mild bruising of left dorsal lateral foot, no erythema or rash.  Psychological: normal affect, alert, oriented, and pleasant.           Xray:  Results for orders placed or performed during the hospital encounter of 04/06/20   XR Foot Left G/E 3 Views     Status: None    Narrative    PROCEDURE:  XR FOOT LT G/E 3 VW    HISTORY: Foot injury, left, initial encounter.    COMPARISON:  None.    TECHNIQUE:  3 views left foot.    FINDINGS:  No fracture or dislocation is identified. There is focal  irregularity at the distal interphalangeal joint of the left fourth  toe; correlate with physical exam. Minimal calcaneal spurring is seen.  No foreign body is seen.       Impression    IMPRESSION: No acute fracture.      MONISHA ALMENDAREZ MD           ASSESSMENT/PLAN:  1. Foot injury, left, initial encounter    - XR Foot Left G/E 3 Views; Future    2. Strain of left foot, initial encounter    Xray of left foot completed and personally reviewed, appears to have a possible avulsion fracture of " the cuboid bone, radiologist over read:  No acute fracture.    - POST OP SHOE DELUXE FEMALE M    Discussed xray results with patient.     Recommend hard soled supportive shoes, patient would like to try a post op shoe.  Recommend RICE treatment, ace wrap provided.  Activity as tolerated.  May use over-the-counter Tylenol or ibuprofen PRN    Follow up if symptoms persist or worsen or concerns      I explained my diagnostic considerations and recommendations to the patient, who voiced understanding and agreement with the treatment plan. All questions were answered. We discussed potential side effects of any prescribed or recommended therapies, as well as expectations for response to treatments.    Disclaimer:  This note consists of words and symbols derived from keyboarding, dictation, or using voice recognition software. As a result, there may be errors in the script that have gone undetected. Please consider this when interpreting information found in this note.

## 2020-04-06 NOTE — NURSING NOTE
Patient presents to the clinic for left foot injury that happened this morning. Patient states she tripped while walking up concrete steps.   Medication Reconciliation: complete    Jolly Guerra, CMA

## 2021-01-03 ENCOUNTER — ALLIED HEALTH/NURSE VISIT (OUTPATIENT)
Dept: FAMILY MEDICINE | Facility: OTHER | Age: 56
End: 2021-01-03
Attending: FAMILY MEDICINE
Payer: OTHER GOVERNMENT

## 2021-01-03 DIAGNOSIS — R05.9 COUGH: Primary | ICD-10-CM

## 2021-01-03 PROCEDURE — U0005 INFEC AGEN DETEC AMPLI PROBE: HCPCS | Mod: ZL | Performed by: FAMILY MEDICINE

## 2021-01-03 PROCEDURE — U0003 INFECTIOUS AGENT DETECTION BY NUCLEIC ACID (DNA OR RNA); SEVERE ACUTE RESPIRATORY SYNDROME CORONAVIRUS 2 (SARS-COV-2) (CORONAVIRUS DISEASE [COVID-19]), AMPLIFIED PROBE TECHNIQUE, MAKING USE OF HIGH THROUGHPUT TECHNOLOGIES AS DESCRIBED BY CMS-2020-01-R: HCPCS | Mod: ZL | Performed by: FAMILY MEDICINE

## 2021-01-03 PROCEDURE — C9803 HOPD COVID-19 SPEC COLLECT: HCPCS

## 2021-01-03 NOTE — NURSING NOTE
Chief Complaint   Patient presents with     Covid 19 Testing     sore throat, fatigue, cough, nausea, fever     Patient swabbed for COVID-19 testing.  Nikolas Watson LPN on 1/3/2021 at 4:34 PM

## 2021-01-04 LAB
SARS-COV-2 RNA SPEC QL NAA+PROBE: NOT DETECTED
SPECIMEN SOURCE: NORMAL

## 2021-01-15 ENCOUNTER — HEALTH MAINTENANCE LETTER (OUTPATIENT)
Age: 56
End: 2021-01-15

## 2021-04-07 ENCOUNTER — IMMUNIZATION (OUTPATIENT)
Dept: FAMILY MEDICINE | Facility: OTHER | Age: 56
End: 2021-04-07
Attending: FAMILY MEDICINE
Payer: OTHER GOVERNMENT

## 2021-04-07 PROCEDURE — 91300 PR COVID VAC PFIZER DIL RECON 30 MCG/0.3 ML IM: CPT

## 2021-04-07 PROCEDURE — 0001A PR COVID VAC PFIZER DIL RECON 30 MCG/0.3 ML IM: CPT

## 2021-04-23 ENCOUNTER — APPOINTMENT (OUTPATIENT)
Dept: CT IMAGING | Facility: OTHER | Age: 56
End: 2021-04-23
Attending: FAMILY MEDICINE

## 2021-04-23 ENCOUNTER — HOSPITAL ENCOUNTER (EMERGENCY)
Facility: OTHER | Age: 56
Discharge: OTHER INSTITUTION | End: 2021-04-24
Attending: FAMILY MEDICINE | Admitting: FAMILY MEDICINE

## 2021-04-23 DIAGNOSIS — F10.920 ALCOHOLIC INTOXICATION WITHOUT COMPLICATION (H): ICD-10-CM

## 2021-04-23 PROCEDURE — 85025 COMPLETE CBC W/AUTO DIFF WBC: CPT | Performed by: FAMILY MEDICINE

## 2021-04-23 PROCEDURE — 80053 COMPREHEN METABOLIC PANEL: CPT | Performed by: FAMILY MEDICINE

## 2021-04-23 PROCEDURE — 99285 EMERGENCY DEPT VISIT HI MDM: CPT | Mod: 25 | Performed by: FAMILY MEDICINE

## 2021-04-23 PROCEDURE — 72125 CT NECK SPINE W/O DYE: CPT | Mod: TC

## 2021-04-23 PROCEDURE — 99284 EMERGENCY DEPT VISIT MOD MDM: CPT | Mod: 25 | Performed by: FAMILY MEDICINE

## 2021-04-23 PROCEDURE — 12001 RPR S/N/AX/GEN/TRNK 2.5CM/<: CPT | Performed by: FAMILY MEDICINE

## 2021-04-23 PROCEDURE — 70450 CT HEAD/BRAIN W/O DYE: CPT | Mod: TC

## 2021-04-23 PROCEDURE — 82077 ASSAY SPEC XCP UR&BREATH IA: CPT | Performed by: FAMILY MEDICINE

## 2021-04-23 PROCEDURE — C9803 HOPD COVID-19 SPEC COLLECT: HCPCS | Performed by: FAMILY MEDICINE

## 2021-04-23 PROCEDURE — 36415 COLL VENOUS BLD VENIPUNCTURE: CPT | Performed by: FAMILY MEDICINE

## 2021-04-24 VITALS
HEART RATE: 68 BPM | OXYGEN SATURATION: 94 % | SYSTOLIC BLOOD PRESSURE: 96 MMHG | RESPIRATION RATE: 20 BRPM | WEIGHT: 172.2 LBS | DIASTOLIC BLOOD PRESSURE: 51 MMHG | TEMPERATURE: 97.6 F | BODY MASS INDEX: 30.5 KG/M2

## 2021-04-24 LAB
ALBUMIN SERPL-MCNC: 4.2 G/DL (ref 3.5–5.7)
ALP SERPL-CCNC: 59 U/L (ref 34–104)
ALT SERPL W P-5'-P-CCNC: 20 U/L (ref 7–52)
ANION GAP SERPL CALCULATED.3IONS-SCNC: 11 MMOL/L (ref 3–14)
AST SERPL W P-5'-P-CCNC: 15 U/L (ref 13–39)
BASOPHILS # BLD AUTO: 0.1 10E9/L (ref 0–0.2)
BASOPHILS NFR BLD AUTO: 0.7 %
BILIRUB SERPL-MCNC: 0.2 MG/DL (ref 0.3–1)
BUN SERPL-MCNC: 7 MG/DL (ref 7–25)
CALCIUM SERPL-MCNC: 10.1 MG/DL (ref 8.6–10.3)
CHLORIDE SERPL-SCNC: 99 MMOL/L (ref 98–107)
CO2 SERPL-SCNC: 28 MMOL/L (ref 21–31)
CREAT SERPL-MCNC: 0.54 MG/DL (ref 0.6–1.2)
DIFFERENTIAL METHOD BLD: NORMAL
EOSINOPHIL # BLD AUTO: 0.1 10E9/L (ref 0–0.7)
EOSINOPHIL NFR BLD AUTO: 1.2 %
ERYTHROCYTE [DISTWIDTH] IN BLOOD BY AUTOMATED COUNT: 12 % (ref 10–15)
ETHANOL SERPL-MCNC: 0.28 %
ETHANOL SERPL-MCNC: 0.36 %
GFR SERPL CREATININE-BSD FRML MDRD: >90 ML/MIN/{1.73_M2}
GLUCOSE SERPL-MCNC: 117 MG/DL (ref 70–105)
HCT VFR BLD AUTO: 41.6 % (ref 35–47)
HGB BLD-MCNC: 14.5 G/DL (ref 11.7–15.7)
IMM GRANULOCYTES # BLD: 0 10E9/L (ref 0–0.4)
IMM GRANULOCYTES NFR BLD: 0.1 %
LABORATORY COMMENT REPORT: NORMAL
LYMPHOCYTES # BLD AUTO: 2.3 10E9/L (ref 0.8–5.3)
LYMPHOCYTES NFR BLD AUTO: 33.1 %
MCH RBC QN AUTO: 31.5 PG (ref 26.5–33)
MCHC RBC AUTO-ENTMCNC: 34.9 G/DL (ref 31.5–36.5)
MCV RBC AUTO: 90 FL (ref 78–100)
MONOCYTES # BLD AUTO: 0.6 10E9/L (ref 0–1.3)
MONOCYTES NFR BLD AUTO: 8.8 %
NEUTROPHILS # BLD AUTO: 3.8 10E9/L (ref 1.6–8.3)
NEUTROPHILS NFR BLD AUTO: 56.1 %
PLATELET # BLD AUTO: 281 10E9/L (ref 150–450)
POTASSIUM SERPL-SCNC: 3.6 MMOL/L (ref 3.5–5.1)
PROT SERPL-MCNC: 6.9 G/DL (ref 6.4–8.9)
RBC # BLD AUTO: 4.6 10E12/L (ref 3.8–5.2)
SARS-COV-2 RNA RESP QL NAA+PROBE: NEGATIVE
SODIUM SERPL-SCNC: 138 MMOL/L (ref 134–144)
SPECIMEN SOURCE: NORMAL
WBC # BLD AUTO: 6.9 10E9/L (ref 4–11)

## 2021-04-24 PROCEDURE — U0005 INFEC AGEN DETEC AMPLI PROBE: HCPCS | Performed by: FAMILY MEDICINE

## 2021-04-24 PROCEDURE — 36415 COLL VENOUS BLD VENIPUNCTURE: CPT | Performed by: FAMILY MEDICINE

## 2021-04-24 PROCEDURE — 82077 ASSAY SPEC XCP UR&BREATH IA: CPT | Performed by: FAMILY MEDICINE

## 2021-04-24 PROCEDURE — U0003 INFECTIOUS AGENT DETECTION BY NUCLEIC ACID (DNA OR RNA); SEVERE ACUTE RESPIRATORY SYNDROME CORONAVIRUS 2 (SARS-COV-2) (CORONAVIRUS DISEASE [COVID-19]), AMPLIFIED PROBE TECHNIQUE, MAKING USE OF HIGH THROUGHPUT TECHNOLOGIES AS DESCRIBED BY CMS-2020-01-R: HCPCS | Performed by: FAMILY MEDICINE

## 2021-04-24 NOTE — ED NOTES
Patient awake, able to answer questions. Ambulated in the hallway with x1 assist. Up to commode with assistance. Provider aware.

## 2021-04-24 NOTE — ED NOTES
Writer called detox and spoke with Kandis.  Kandis states that she would like another Ethanol drawn since the pt was 0.36 on arrival.

## 2021-04-24 NOTE — ED NOTES
Writer called detox and irma states that she will accept pt.  Writer then called protective transport and they will call back if there is a  available.

## 2021-04-24 NOTE — ED NOTES
Lab called with a critical lab ETOH of 0.36.  Dr. Shore was notified.  Maribel Jacobo RN on 4/24/2021 at 12:34 AM
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
## 2021-04-24 NOTE — ED PROVIDER NOTES
History     Chief Complaint   Patient presents with     Alcohol Intoxication     Fall     HPI  Rowena Henry is a 55 year old female who was brought from home by EMS after her sister found her intoxicated and falling down with a cut on the right side of her head.  Patient admits to drinking lots of beer but was not particularly forthcoming when asked any other questions.  On the way to the hospital when he brought her to the cot to lay her down she passed out but she woke up quickly with a sternal rub per EMS.    Allergies:  No Known Allergies    Problem List:    Patient Active Problem List    Diagnosis Date Noted     Dysplasia of cervix 01/23/2018     Priority: Medium     Panic disorder 01/23/2018     Priority: Medium     Other urinary incontinence 01/23/2018     Priority: Medium     Other and unspecified noninfectious gastroenteritis and colitis(558.9) 02/19/2013     Priority: Medium     H/O adenomatous polyp of colon 02/15/2013     Priority: Medium     Chronic rhinitis 06/26/2012     Priority: Medium        Past Medical History:    Past Medical History:   Diagnosis Date     Breast lump      Dysplasia of cervix uteri        Past Surgical History:    Past Surgical History:   Procedure Laterality Date     COLONOSCOPY  02/18/2013 2/18/13,Colonoscopy F/U 2016     OTHER SURGICAL HISTORY      386132,OTHER     OTHER SURGICAL HISTORY      24588.0,[place] CONIZATION CERVIX KNIFE/LASER     OTHER SURGICAL HISTORY      1989,45346.0,[place] LIGATE FALLOPIAN TUBE     OTHER SURGICAL HISTORY      1/2011,631129,OTHER,[place]       Family History:    Family History   Problem Relation Age of Onset     Heart Disease Mother         Heart Disease     Heart Disease Maternal Grandfather         Heart Disease     Heart Disease Father         Heart Disease     Other - See Comments Maternal Grandmother 80        leukemia     Heart Disease Maternal Uncle         Heart Disease     Other - See Comments Paternal Uncle 60        melanoma      Breast Cancer No family hx of         Cancer-breast     Colon Cancer No family hx of         Cancer-colon     Ovarian Cancer No family hx of         Cancer-ovarian     Prostate Cancer No family hx of         Cancer-prostate     Diabetes No family hx of         Diabetes     Hypertension No family hx of         Hypertension     Thyroid Disease No family hx of         Thyroid Disease       Social History:  Marital Status:  Single [1]  Social History     Tobacco Use     Smoking status: Current Every Day Smoker     Packs/day: 1.00     Years: 32.00     Pack years: 32.00     Types: Cigarettes     Smokeless tobacco: Never Used   Substance Use Topics     Alcohol use: Yes     Alcohol/week: 21.0 standard drinks     Comment: Alcoholic Drinks/day: nightly wine  to help her sleep     Drug use: No     Comment: Drug use: No        Medications:    acyclovir (ZOVIRAX) 800 MG tablet  citalopram (CELEXA) 40 MG tablet  clonazePAM (KLONOPIN) 1 MG tablet  Multiple Vitamins-Minerals (MULTIVITAMIN ADULT PO)      Review of Systems   Unable to perform ROS: Mental status change   Psychiatric/Behavioral:        PAtient is intoxicated and doesn't want to answer questions. Doesn't remember how she fell or what she fell into. Denies any headache or pain.      Physical Exam   BP: 122/65  Pulse: 80  Temp: 97.6  F (36.4  C)  Resp: 20  Weight: 78.1 kg (172 lb 3.2 oz)  SpO2: 95 %    Physical Exam  Vitals signs and nursing note reviewed.   Constitutional:       Appearance: Normal appearance. She is normal weight.      Comments: Obviously intoxicated.    HENT:      Head: Normocephalic and atraumatic.      Right Ear: Tympanic membrane normal.      Left Ear: Tympanic membrane normal.      Mouth/Throat:      Mouth: Mucous membranes are moist.      Pharynx: Oropharynx is clear.   Eyes:      Extraocular Movements: Extraocular movements intact.      Conjunctiva/sclera: Conjunctivae normal.      Pupils: Pupils are equal, round, and reactive to light.   Neck:       Musculoskeletal: Normal range of motion and neck supple. No neck rigidity or muscular tenderness.   Cardiovascular:      Rate and Rhythm: Normal rate and regular rhythm.      Heart sounds: Normal heart sounds.   Pulmonary:      Effort: Pulmonary effort is normal.      Breath sounds: Normal breath sounds.   Abdominal:      General: Abdomen is flat. Bowel sounds are normal.   Musculoskeletal: Normal range of motion.   Skin:     General: Skin is warm and dry.      Capillary Refill: Capillary refill takes less than 2 seconds.   Neurological:      General: No focal deficit present.       ED Course     ED Course as of Apr 24 0454   Sat Apr 24, 2021   0036 Only lab in critical level   Ethanol GH(!!)   0037 CT head and cervical spine is negative.       0259 72 hour hold instituted. Patient will be sent to Detox- family is not comfortable with taking patient home. She is stable from a medical standpoint.       0315 Detox wants another ETOH level- she can be sent there as long as she is below 0.3      0347 Ethanol g/dL(!): 0.28   0347 Awaiting COVID 19 swab.       0453 Asymptomatic SARS-CoV-2 COVID-19 Virus (Coronavirus) by PCR   Patient seen and examined.. Labs ordered.   See results above.   COVID 19 swab is negative. Patient sent to detox by protective custody.     Procedures  [facility] Procedure Note        Laceration Repair:    Performed by: Melinda Shore MD  Authorized by: Melinda Shore MD  Consent given by: Patient who states understanding of the procedure being performed after discussing the risks, benefits and alternatives.    Preparation: Patient was prepped and draped in usual sterile fashion.  Irrigation solution: saline    Body area:scalp  Laceration length: 2cm  Contamination: The wound is not contaminated.  Foreign bodies:none  Tendon involvement: none  Anesthesia: Local  Local anesthetic: Lidocaine     1%, with epinephrine  Anesthetic total: 3ml    Debridement: none  Skin closure: Closed with 7  "Staples  Technique: interrupted  Approximation: close  Approximation difficulty: simple    Patient tolerance: Patient tolerated the procedure well with no immediate complications.  Patient is more compliant with nursing staff and is answering appropriately. When clinical more sober will consider discharge to detox or home if family is willing to take her and be responsible for her.     Results for orders placed or performed during the hospital encounter of 04/23/21 (from the past 24 hour(s))   CT Head w/o Contrast    Narrative    PROCEDURE INFORMATION:   Exam: CT Head Without Contrast   Exam date and time: 4/23/2021 11:59 PM   Age: 55 years old   Clinical indication: Injury or trauma; Blunt trauma (contusions or hematomas);   Consciousness not specified; Injury details: Patient arrived via EMS, per   verbal report patient was found by sister tonight, intoxicated, stumbling and   falling down. Patient hit right posterior side of head, has a laceration.   Bleeding controlled. Patient states she \"drank plenty of beer. \" patient alert,   able to answer questions. EMS was helping her to cot when she passed out, EMS   performed sternal rub and patient woke up.     TECHNIQUE:   Imaging protocol: Computed tomography of the head without contrast. Axial,   coronal and sagittal reformatted images were created and reviewed.   Radiation optimization: All CT scans at this facility use at least one of these   dose optimization techniques: automated exposure control; mA and/or kV   adjustment per patient size (includes targeted exams where dose is matched to   clinical indication); or iterative reconstruction.     COMPARISON:   CT HEAD BRAIN WO 5/16/2016 12:50 AM     FINDINGS:   Brain: Subtle, patchy areas of hypoattenuation in the periventricular and   subcortical white matter, nonspecific but suggestive of mild chronic small   vessel ischemic disease. No CT evidence of acute intracranial hemorrhage or   acute territorial " "infarction. No significant mass effect or midline shift.   Basal cisterns patent.   Cerebral ventricles: Normal in size and configuration.   Bones/joints: No acute osseous abnormality.   Paranasal sinuses: Unremarkable. No fluid levels.   Mastoid air cells: Grossly unremarkable.   Soft tissues: Right posterior parietal scalp injury.       Impression    IMPRESSION:   1. No CT evidence of acute intracranial pathology.   2. Additional findings, as above.     THIS DOCUMENT HAS BEEN ELECTRONICALLY SIGNED BY ZELALEM FOREMNA MD   CT Cervical Spine w/o Contrast    Narrative    PROCEDURE INFORMATION:   Exam: CT Cervical Spine Without Contrast   Exam date and time: 4/23/2021 11:59 PM   Age: 55 years old   Clinical indication: Injury or trauma; Blunt trauma; Injury details: Patient   arrived via EMS, per verbal report patient was found by sister tonight,   intoxicated, stumbling and falling down. Patient hit right posterior side of   head, has a laceration. Bleeding controlled. Patient states she \"drank plenty   of beer. \" patient alert, able to answer questions. EMS was helping her to cot   when she passed out, EMS performed sternal rub and patient woke up.     TECHNIQUE:   Imaging protocol: Computed tomography images of the cervical spine without   contrast. Axial, coronal and sagittal reformatted images were created and   reviewed.   Radiation optimization: All CT scans at this facility use at least one of these   dose optimization techniques: automated exposure control; mA and/or kV   adjustment per patient size (includes targeted exams where dose is matched to   clinical indication); or iterative reconstruction.     COMPARISON:   No relevant prior studies available.     FINDINGS:   Bones/joints:  Osteopenia.  Mild reversal of the normal cervical lordosis. No   CT evidence of acute fracture, dislocation or subluxation.  Minimal   anterolisthesis of C3 on C4 and C4 on C5.  Alignment otherwise anatomic.   Vertebral body " heights maintained.   Discs/Spinal canal/Neural foramina:  Mild multilevel spondylosis. No   significant spinal canal or neural foraminal stenosis.     Lungs: Grossly unremarkable.   Soft tissues: Grossly unremarkable.       Impression    IMPRESSION:   1. No CT evidence of acute cervical spine traumatic injury.   2. Additional findings, as above.     THIS DOCUMENT HAS BEEN ELECTRONICALLY SIGNED BY ZELALEM FOREMAN MD   Ethanol GH   Result Value Ref Range    Ethanol g/dL 0.28 (H) <0.01 %       Medications - No data to display    Assessments & Plan (with Medical Decision Making)     I have reviewed the nursing notes.    I have reviewed the findings, diagnosis, plan and need for follow up with the patient.    New Prescriptions    No medications on file       Final diagnoses:   Alcoholic intoxication without complication (H)       4/23/2021   [facility] MD  04/24/21 [id number]

## 2021-04-25 NOTE — ED PROVIDER NOTES
8:22 PM Multiple phone calls from patient about being released from her 72 hour hold.   She was sent last night as family refused to transport home and didn't feel she would be safe. Patient refused transfer to detox and was thus sent on hold.     Daxa from [facility] states that patient is back to what appears to be her baseline. She has not required any Ativan or intervention. She feels she is appropriate for discharge back to her home. She has not had any signs of withdrawal and has been appropriate.   She has not been in detox previously. Please see nursing note regarding conversations.     Hold will be released at 8:24 pm. The facility will be notified by nursing and a copy of this note will be sent to them.      Melinda Shore MD  04/24/21 2025

## 2021-04-25 NOTE — PROGRESS NOTES
Rowena called from Detox.  She is questioning why she was placed on a 72 hour hold.  This writer spoke with Dr. Shore.  Patient was placed on a hold yesterday due to her elevated blood alcohol level.  It was deemed that patient was unsafe to discharge to home by herself and patient's brother did not feel that their sister could care for her either.   Patient's brother was here last night and he informed us that he was in communication with the rest of patient's family.  Rowena stated that her sister was expecting a call yesterday from us when patient was able to discharge .  This writer apologized for the lack of communication on our end and that we believed that the brother was keeping the other family updated.  Rowena stated that her sister will be calling us and that we can give her a full update.      Ashley called. She stated that she was expecting a call from us yesterday.  Explained to Ashley what I informed Rowena of.  Ashley states that she lives with her sister and that she will be able to keep an eye on her and is requesting that we release  the hold.      Kandis Brantley LPN from [facility] called.  [facility] is not able to release patient's from their hold.  It has to come from the provider who placed the patient on the initial hold.  Daxa states that patient is vitally stable (is hypotensive but patient states that she normally runs low) and is showing no signs or symptoms of withdraw. Patient has not received any ativan during her stay. This stay is the first time that patient has been to [facility].  Kandis feels that patient can be safely released for from her hold.      Spoke with Dr. Shore and relayed all the information above.  Patient will be released from her hold.    Called [facility] back and spoke with Daxa.  Release note will be faxed to [facility] at [phone].  Maribel Jacobo RN on 4/24/2021 at 8:32 PM

## 2021-04-28 ENCOUNTER — IMMUNIZATION (OUTPATIENT)
Dept: FAMILY MEDICINE | Facility: OTHER | Age: 56
End: 2021-04-28
Attending: FAMILY MEDICINE
Payer: OTHER GOVERNMENT

## 2021-04-28 PROCEDURE — 91300 PR COVID VAC PFIZER DIL RECON 30 MCG/0.3 ML IM: CPT

## 2021-04-28 PROCEDURE — 0002A PR COVID VAC PFIZER DIL RECON 30 MCG/0.3 ML IM: CPT

## 2021-05-08 ENCOUNTER — HOSPITAL ENCOUNTER (EMERGENCY)
Facility: OTHER | Age: 56
Discharge: HOME | End: 2021-05-08

## 2021-05-08 VITALS
OXYGEN SATURATION: 94 % | DIASTOLIC BLOOD PRESSURE: 68 MMHG | HEART RATE: 97 BPM | RESPIRATION RATE: 14 BRPM | TEMPERATURE: 99.4 F | SYSTOLIC BLOOD PRESSURE: 139 MMHG

## 2021-05-08 NOTE — ED TRIAGE NOTES
Patient here for staple removal.  7 staples removed from posterior scalp.  No s/s of infection.  /68   Pulse 97   Temp 99.4  F (37.4  C) (Temporal)   Resp 14   SpO2 94%

## 2021-10-03 ENCOUNTER — HEALTH MAINTENANCE LETTER (OUTPATIENT)
Age: 56
End: 2021-10-03

## 2021-11-04 ENCOUNTER — OFFICE VISIT (OUTPATIENT)
Dept: FAMILY MEDICINE | Facility: OTHER | Age: 56
End: 2021-11-04
Attending: NURSE PRACTITIONER
Payer: COMMERCIAL

## 2021-11-04 VITALS
BODY MASS INDEX: 28.14 KG/M2 | SYSTOLIC BLOOD PRESSURE: 138 MMHG | OXYGEN SATURATION: 95 % | WEIGHT: 158.8 LBS | TEMPERATURE: 98.5 F | RESPIRATION RATE: 20 BRPM | DIASTOLIC BLOOD PRESSURE: 82 MMHG | HEART RATE: 89 BPM | HEIGHT: 63 IN

## 2021-11-04 DIAGNOSIS — R06.02 SHORTNESS OF BREATH: ICD-10-CM

## 2021-11-04 DIAGNOSIS — R05.9 COUGH: Primary | ICD-10-CM

## 2021-11-04 DIAGNOSIS — R06.2 WHEEZING: ICD-10-CM

## 2021-11-04 PROCEDURE — C9803 HOPD COVID-19 SPEC COLLECT: HCPCS

## 2021-11-04 PROCEDURE — U0003 INFECTIOUS AGENT DETECTION BY NUCLEIC ACID (DNA OR RNA); SEVERE ACUTE RESPIRATORY SYNDROME CORONAVIRUS 2 (SARS-COV-2) (CORONAVIRUS DISEASE [COVID-19]), AMPLIFIED PROBE TECHNIQUE, MAKING USE OF HIGH THROUGHPUT TECHNOLOGIES AS DESCRIBED BY CMS-2020-01-R: HCPCS | Mod: ZL | Performed by: NURSE PRACTITIONER

## 2021-11-04 PROCEDURE — G0463 HOSPITAL OUTPT CLINIC VISIT: HCPCS

## 2021-11-04 PROCEDURE — 99213 OFFICE O/P EST LOW 20 MIN: CPT | Performed by: NURSE PRACTITIONER

## 2021-11-04 RX ORDER — ALBUTEROL SULFATE 90 UG/1
2 AEROSOL, METERED RESPIRATORY (INHALATION) EVERY 4 HOURS PRN
Qty: 18 G | Refills: 0 | Status: SHIPPED | OUTPATIENT
Start: 2021-11-04

## 2021-11-04 RX ORDER — DEXAMETHASONE SODIUM PHOSPHATE 4 MG/ML
10 VIAL (ML) INJECTION ONCE
Status: DISCONTINUED | OUTPATIENT
Start: 2021-11-04 | End: 2021-11-04 | Stop reason: CLARIF

## 2021-11-04 ASSESSMENT — MIFFLIN-ST. JEOR: SCORE: 1274.44

## 2021-11-04 ASSESSMENT — PAIN SCALES - GENERAL: PAINLEVEL: MILD PAIN (2)

## 2021-11-04 NOTE — NURSING NOTE
"Chief Complaint   Patient presents with     Cough     Cough and shortness of breath has been going on since Monday.    Initial There were no vitals taken for this visit. Estimated body mass index is 30.5 kg/m  as calculated from the following:    Height as of 4/6/20: 1.6 m (5' 3\").    Weight as of 4/23/21: 78.1 kg (172 lb 3.2 oz).     FOOD SECURITY SCREENING QUESTIONS  Hunger Vital Signs:  Within the past 12 months we worried whether our food would run out before we got money to buy more. Never  Within the past 12 months the food we bought just didn't last and we didn't have money to get more. Never      Medication Reconciliation: Complete      Maximo Leal LPN   "

## 2021-11-04 NOTE — PROGRESS NOTES
ASSESSMENT/PLAN:  1. Cough    - Symptomatic COVID-19 Virus (Coronavirus) by PCR Nose    2. Shortness of breath    - albuterol (PROAIR HFA/PROVENTIL HFA/VENTOLIN HFA) 108 (90 Base) MCG/ACT inhaler; Inhale 2 puffs into the lungs every 4 hours as needed for shortness of breath / dyspnea or wheezing  Dispense: 18 g; Refill: 0    3. Wheezing    - albuterol (PROAIR HFA/PROVENTIL HFA/VENTOLIN HFA) 108 (90 Base) MCG/ACT inhaler; Inhale 2 puffs into the lungs every 4 hours as needed for shortness of breath / dyspnea or wheezing  Dispense: 18 g; Refill: 0      COVID result is pending. Instructed the patient to remain in quarantine until she receives her COVID result as long as this is negative. A letter was written on the patient's behalf.      Discussed with patient that symptoms and exam are consistent with viral illness, COVID vs. Viral bronchitis.  Discussed that symptomatic treatment is appropriate but not with antibiotics.     Dexamethasone 10 mg PO once was prescribed, but patient decided to decline during today's visit.      Symptomatic treatment - Encouraged fluids, salt water gargles, honey, lozenges, etc     Instructed the patient to follow up if her symptoms worsen or she beings to have increased shortness of breath.     May use over-the-counter Tylenol or ibuprofen PRN    Discussed warning signs/symptoms indicative of need to f/u    Follow up if symptoms persist or worsen or concerns      I explained my diagnostic considerations and recommendations to the patient, who voiced understanding and agreement with the treatment plan. All questions were answered. We discussed potential side effects of any prescribed or recommended therapies, as well as expectations for response to treatments.        HPI:    Rowena Henry is a 56 year old female  who presents to [facility] today for non productive cough, rhinitis, nausea, lose stools, nasal congestion and shortness of breath. Symptoms started on Monday. Denies fevers.  "Farnaz sore throat. After coughing she has shortness of breathing. Denies chest pressure or tightness. She had direct exposure to COVID last week. Farnaz taking any OTC medication. Has had both COVID vaccines. Reports smoking 1 pack per day, states that this has decreased due to her symptoms. Denies history of asthma.     Past Medical History:   Diagnosis Date     Breast lump     12/04     Dysplasia of cervix uteri     No Comments Provided     Past Surgical History:   Procedure Laterality Date     COLONOSCOPY  02/18/2013 2/18/13,Colonoscopy F/U 2016     OTHER SURGICAL HISTORY      [id number],OTHER     OTHER SURGICAL HISTORY      74853.0,[place] CONIZATION CERVIX KNIFE/LASER     OTHER SURGICAL HISTORY      1989,95440.0,[place] LIGATE FALLOPIAN TUBE     OTHER SURGICAL HISTORY      1/2011,416052,OTHER,[place]     Social History     Tobacco Use     Smoking status: Current Every Day Smoker     Packs/day: 1.00     Years: 32.00     Pack years: 32.00     Types: Cigarettes     Smokeless tobacco: Never Used   Substance Use Topics     Alcohol use: Yes     Alcohol/week: 21.0 standard drinks     Comment: Alcoholic Drinks/day: nightly wine  to help her sleep     Current Outpatient Medications   Medication Sig Dispense Refill     citalopram (CELEXA) 40 MG tablet Take 1 tablet by mouth daily.       clonazePAM (KLONOPIN) 1 MG tablet Take 1 tablet (1 mg) by mouth 2 times daily as needed for anxiety Every bedtime       No Known Allergies      Past medical history, past surgical history, current medications and allergies reviewed and accurate to the best of my knowledge.        ROS:  Refer to HPI    /82   Pulse 89   Temp 98.5  F (36.9  C) (Tympanic)   Resp 20   Ht 1.6 m (5' 3\")   Wt 72 kg (158 lb 12.8 oz)   SpO2 95%   BMI 28.13 kg/m      EXAM:  General Appearance: Well appearing female, appropriate appearance for age. No acute distress  Ears: Left TM intact, translucent with bony landmarks appreciated, no erythema, no effusion, no " bulging, no purulence.  Right TM intact, translucent with bony landmarks appreciated, no erythema, no effusion, no bulging, no purulence.  Left auditory canal clear.  Right auditory canal clear.  Normal external ears, non tender.  Orophayrnx: moist mucous membranes, posterior pharynx without erythema, tonsils without hypertrophy, no erythema, no exudates or petechiae, post nasal drip noted, no trismus, voice clear.    Neck: supple without adenopathy  Respiratory: normal chest wall and respirations.  Normal effort.  Wheezing to auscultation to upper left lobe, no crackles or rhonchi.  No increased work of breathing.  Cough appreciated.  Cardiac: RRR with no murmurs  Psychological: normal affect, alert, oriented, and pleasant.

## 2021-11-04 NOTE — LETTER
November 4, 2021        Rowena Henry   [address] [place] [id number]    To Whom it May Concern:    Rowena Henry was seen in our office on 11/4/2021 and was given the following instructions:  Patient may return to work once she receives her COVID result as long as this is negative and she is fever free for 24 hours without the use of fever reducing medication.    Sincerely,          Rocio Fierro NP ..................11/4/2021 4:46 PM

## 2021-11-04 NOTE — PATIENT INSTRUCTIONS
Patient Education     Acute Bronchitis  Your healthcare provider has told you that you have acute bronchitis. Bronchitis is infection or inflammation of the airways in the lungs (bronchial tubes). Normally, air moves easily in and out of the airways. Bronchitis narrows the airways. This makes it harder for air to flow in and out of the lungs. This causes symptoms such as shortness of breath, coughing up yellow or green mucus, and wheezing.  Bronchitis can be acute or chronic. Acute means it happens quickly and goes away in a short time. Chronic means a condition lasts a long time and often comes back. Most people with acute bronchitis get better in 1 to 2 weeks.     What causes acute bronchitis?  Acute bronchitis is often caused by a virus such as a cold or the flu. In some cases, it may be caused by bacteria. Certain factors make it more likely for a cold or flu to turn into bronchitis. These include being very young, being elderly, having a heart or lung problem, or having a weak immune system. Cigarette smoking also makes bronchitis more likely.  When bronchitis develops, the airways become swollen. The airways may also become infected with bacteria. This is known as a secondary infection.  Symptoms of acute bronchitis  Symptoms can include:    Coughing with mucus    Wheezing    Feeling short of breath    Chest pain    Fever  Diagnosing acute bronchitis  Your healthcare provider will ask about your symptoms and health history. He or she will give you a physical exam. This will include listening to your lungs while you breathe. You may have a chest X-ray to look for infection in the lungs (pneumonia) if you have had a fever. You may also have blood tests to check for infection.  Treating acute bronchitis  Bronchitis usually goes away in 1 to 2 weeks without treatment. You can help feel better by:    Taking medicine as directed. Talk to your healthcare provider before taking any over-the-counter medicines (OTC).  Some OTC medicines help relieve inflammation in your bronchial tubes. They can also thin mucus. This makes it easier to cough up. Your healthcare provider may prescribe an inhaler to help open up the bronchial tubes. Most of the time, acute bronchitis is caused by a viral infection. Antibiotics are usually not prescribed for viral infections.    Drinking plenty of fluids, such as water, juice, or warm soup. Fluids loosen mucus so that you can cough it up. This helps you breathe more easily. Fluids also prevent dehydration.    Using a humidifier. This can help reduce coughing.    Getting plenty of rest    Not smoking. Also, don't let anyone else smoke in your home. In public places, move away from secondhand smoke.  Recovery and follow-up  Follow up with your doctor. You will likely feel better in 1 to 2 weeks. But you may have a dry cough for a longer time. Let your doctor know if you still have symptoms other than a dry cough after 2 weeks. Tell him or her if you get bronchial infections often.  Self-care tips  To get relief from your symptoms and prevent bronchitis:    Stop smoking. Stopping smoking is the most important step you can take to treat bronchitis. If you need help stopping smoking, talk with your healthcare provider.    Stay away from secondhand smoke and other irritants. Try to stay away from smoke, chemicals, fumes, and dust. Don t let anyone smoke in your home. Stay indoors on smoggy days.    Prevent lung infections. Ask your healthcare provider about the flu and pneumonia vaccines. Take steps to prevent colds and other lung infections.    Wash your hands well. Wash your hands often with soap and water. Use hand  when you can t wash your hands. Stay away from crowds during cold and flu season.  When to call your healthcare provider  Call the healthcare provider if you have any of these:    Fever of 100.4 F ( 38.0 C) or higher, or as directed by your healthcare provider    Symptoms that get  worse, or new symptoms    Breathing not getting better with treatments    Symptoms that don t start to get better in 1 week  [employer] last reviewed this educational content on 8/1/2019 2000-2021 [employer]. All rights reserved. This information is not intended as a substitute for professional medical care. Always follow your healthcare professional's instructions.

## 2021-11-05 LAB — SARS-COV-2 RNA RESP QL NAA+PROBE: NEGATIVE

## 2022-02-21 ENCOUNTER — TRANSFERRED RECORDS (OUTPATIENT)
Dept: HEALTH INFORMATION MANAGEMENT | Facility: OTHER | Age: 57
End: 2022-02-21
Payer: COMMERCIAL

## 2022-02-23 ENCOUNTER — PATIENT OUTREACH (OUTPATIENT)
Dept: INTERNAL MEDICINE | Facility: OTHER | Age: 57
End: 2022-02-23
Payer: COMMERCIAL

## 2022-02-23 NOTE — LETTER
February 23, 2022      Rowena Henry  [address]   [facility] [address]      Your healthcare team cares about your health. To provide you with the best care,   we have reviewed your chart and based on our findings, we see that you are due to:     - BREAST CANCER SCREENING:  Please call to Schedule Annual Mammogram at [phone].      If you have already completed these items, please contact the clinic via phone or   Mychart so your care team can review and update your records. Thank you for   choosing [facility] for your healthcare needs. For any questions,   concerns, or to schedule an appointment please contact the clinic.       Healthy Regards,      Your [facility] Care Team          Enclosure: N/A

## 2022-02-23 NOTE — TELEPHONE ENCOUNTER
Patient Quality Outreach      Summary:    Patient has the following on her problem list/HM: None    Patient is due/failing the following:   Breast Cancer Screening - Mammogram    Type of outreach:    Sent letter.    Questions for provider review:    None                                                                                                                                     May Espinoza LPN .......2/23/2022 3:00 PM      Chart routed to .

## (undated) RX ORDER — LIDOCAINE HYDROCHLORIDE AND EPINEPHRINE 10; 10 MG/ML; UG/ML
INJECTION, SOLUTION INFILTRATION; PERINEURAL
Status: DISPENSED
Start: 2021-04-24